# Patient Record
Sex: MALE | Race: WHITE | NOT HISPANIC OR LATINO | URBAN - METROPOLITAN AREA
[De-identification: names, ages, dates, MRNs, and addresses within clinical notes are randomized per-mention and may not be internally consistent; named-entity substitution may affect disease eponyms.]

---

## 2019-05-03 ENCOUNTER — SURGERY - HEALTHEAST (OUTPATIENT)
Dept: SURGERY | Facility: CLINIC | Age: 39
End: 2019-05-03

## 2019-05-03 ENCOUNTER — ANESTHESIA - HEALTHEAST (OUTPATIENT)
Dept: SURGERY | Facility: CLINIC | Age: 39
End: 2019-05-03

## 2019-05-03 ASSESSMENT — MIFFLIN-ST. JEOR
SCORE: 1702.32
SCORE: 1667.45

## 2021-05-28 NOTE — ANESTHESIA CARE TRANSFER NOTE
Last vitals:   Vitals:    05/03/19 1347   BP: 112/58   Pulse: 73   Resp: 15   Temp: 36.3  C (97.3  F)   SpO2: 93%     Patient's level of consciousness is drowsy  Spontaneous respirations: yes  Maintains airway independently: yes  Dentition unchanged: yes  Oropharynx: oropharynx clear of all foreign objects    QCDR Measures:  ASA# 20 - Surgical Safety Checklist: WHO surgical safety checklist completed prior to induction    PQRS# 430 - Adult PONV Prevention: 4558F - Pt received => 2 anti-emetic agents (different classes) preop & intraop  ASA# 8 - Peds PONV Prevention: NA - Not pediatric patient, not GA or 2 or more risk factors NOT present  PQRS# 424 - Meli-op Temp Management: 4559F - At least one body temp DOCUMENTED => 35.5C or 95.9F within required timeframe  PQRS# 426 - PACU Transfer Protocol: - Transfer of care checklist used  ASA# 14 - Acute Post-op Pain: ASA14B - Patient did NOT experience pain >= 7 out of 10

## 2021-05-28 NOTE — ANESTHESIA PREPROCEDURE EVALUATION
Anesthesia Evaluation      Patient summary reviewed     Airway   Mallampati: II  Neck ROM: full   Pulmonary - normal exam   (+) asthma                           Cardiovascular - negative ROS and normal exam   Neuro/Psych - negative ROS     Endo/Other - negative ROS      GI/Hepatic/Renal            Dental - normal exam                        Anesthesia Plan  Planned anesthetic: general LMA and peripheral nerve block    ASA 2   Induction: intravenous   Anesthetic plan and risks discussed with: patient    Post-op plan: routine recovery

## 2021-05-28 NOTE — ANESTHESIA PROCEDURE NOTES
Peripheral Block    Patient location during procedure: pre-op  Start time: 5/3/2019 12:05 PM  End time: 5/3/2019 12:10 PM  post-op analgesia per surgeon order as noted in medical record  Staffing:  Performing  Anesthesiologist: Juvencio Koroma MD  Preanesthetic Checklist  Completed: patient identified, site marked, risks, benefits, and alternatives discussed, timeout performed, consent obtained, at patient's request, airway assessed, oxygen available, suction available, emergency drugs available and hand hygiene performed  Peripheral Block  Block type: saphenous  Prep: ChloraPrep  Patient position: supine  Patient monitoring: continuous pulse oximetry, heart rate, blood pressure and cardiac monitor  Laterality: left  Injection technique: ultrasound guided            Needle  Needle type: Stimuplex   Needle gauge: 22 G  Needle length: 4 in    Assessment  Injection assessment: negative aspiration for heme, no paresthesia on injection, incremental injection and no difficulty with injection

## 2021-05-28 NOTE — ANESTHESIA PROCEDURE NOTES
Peripheral Block    Patient location during procedure: pre-op  Start time: 5/3/2019 12:00 PM  End time: 5/3/2019 12:05 PM    Staffing:  Performing  Anesthesiologist: Juvencio Koroma MD  Peripheral Block  Block type: sciatic, popliteal  Prep: ChloraPrep  Patient position: supine  Patient monitoring: cardiac monitor, continuous pulse oximetry, heart rate and blood pressure  Laterality: left  Injection technique: ultrasound guided    Ultrasound used to visualize needle placement in proximity to nerve being blocked: yes   Permanent ultrasound image captured for medical record  Sterile gel and probe cover used for ultrasound.    Needle  Needle type: Stimuplex   Needle gauge: 20G  Needle length: 6 in  no peripheral nerve catheter placed  Assessment  Injection assessment: no difficulty with injection

## 2021-05-28 NOTE — ANESTHESIA POSTPROCEDURE EVALUATION
Patient: Henry Ledbetter  OPEN REDUCTION INTERNAL FIXATION, FRACTURE, LEFT PILON FRACTURE, OPEN REDUCTION INTERNAL FIXATION LEFT TIBULA FRACTURE  Anesthesia type: general    Patient location: PACU  Last vitals:   Vitals Value Taken Time   /78 5/3/2019  3:30 PM   Temp 36.7  C (98  F) 5/3/2019  2:40 PM   Pulse 81 5/3/2019  3:39 PM   Resp 16 5/3/2019  3:00 PM   SpO2 97 % 5/3/2019  3:39 PM   Vitals shown include unvalidated device data.  Post vital signs: stable  Level of consciousness: awake and responds to simple questions  Post-anesthesia pain: pain controlled  Post-anesthesia nausea and vomiting: no  Pulmonary: unassisted, return to baseline  Cardiovascular: stable and blood pressure at baseline  Hydration: adequate  Anesthetic events: no    QCDR Measures:  ASA# 11 - Meli-op Cardiac Arrest: ASA11B - Patient did NOT experience unanticipated cardiac arrest  ASA# 12 - Meli-op Mortality Rate: ASA12B - Patient did NOT die  ASA# 13 - PACU Re-Intubation Rate: NA - No ETT / LMA used for case  ASA# 10 - Composite Anes Safety: ASA10A - No serious adverse event    Additional Notes:

## 2021-06-03 VITALS — WEIGHT: 185.31 LBS | HEIGHT: 65 IN | BODY MASS INDEX: 30.88 KG/M2

## 2021-06-27 ENCOUNTER — HEALTH MAINTENANCE LETTER (OUTPATIENT)
Age: 41
End: 2021-06-27

## 2021-10-17 ENCOUNTER — HEALTH MAINTENANCE LETTER (OUTPATIENT)
Age: 41
End: 2021-10-17

## 2022-07-24 ENCOUNTER — HEALTH MAINTENANCE LETTER (OUTPATIENT)
Age: 42
End: 2022-07-24

## 2022-10-02 ENCOUNTER — HEALTH MAINTENANCE LETTER (OUTPATIENT)
Age: 42
End: 2022-10-02

## 2023-08-12 ENCOUNTER — HEALTH MAINTENANCE LETTER (OUTPATIENT)
Age: 43
End: 2023-08-12